# Patient Record
Sex: MALE | Race: WHITE | NOT HISPANIC OR LATINO | Employment: OTHER | URBAN - METROPOLITAN AREA
[De-identification: names, ages, dates, MRNs, and addresses within clinical notes are randomized per-mention and may not be internally consistent; named-entity substitution may affect disease eponyms.]

---

## 2024-10-07 ENCOUNTER — TELEPHONE (OUTPATIENT)
Dept: FAMILY MEDICINE | Facility: CLINIC | Age: 74
End: 2024-10-07
Payer: MEDICARE

## 2024-10-07 NOTE — TELEPHONE ENCOUNTER
----- Message from Dyan sent at 10/7/2024  8:31 AM CDT -----  Pt was in with his son and was told if his sciatica didn't get better to call us. He is a new patient with [facility]   [phone]

## 2024-10-22 ENCOUNTER — OFFICE VISIT (OUTPATIENT)
Dept: FAMILY MEDICINE | Facility: CLINIC | Age: 74
End: 2024-10-22
Payer: MEDICARE

## 2024-10-22 DIAGNOSIS — M54.40 ACUTE RIGHT-SIDED LOW BACK PAIN WITH SCIATICA, SCIATICA LATERALITY UNSPECIFIED: ICD-10-CM

## 2024-10-22 DIAGNOSIS — Z00.00 PHYSICAL EXAM: ICD-10-CM

## 2024-10-22 DIAGNOSIS — E78.5 HYPERLIPIDEMIA, UNSPECIFIED HYPERLIPIDEMIA TYPE: ICD-10-CM

## 2024-10-22 DIAGNOSIS — F17.200 NICOTINE DEPENDENCE, UNCOMPLICATED, UNSPECIFIED NICOTINE PRODUCT TYPE: ICD-10-CM

## 2024-10-22 DIAGNOSIS — Z79.899 HIGH RISK MEDICATION USE: Primary | ICD-10-CM

## 2024-10-22 PROCEDURE — 3288F FALL RISK ASSESSMENT DOCD: CPT | Mod: CPTII,S$GLB,, | Performed by: NURSE PRACTITIONER

## 2024-10-22 PROCEDURE — 1125F AMNT PAIN NOTED PAIN PRSNT: CPT | Mod: CPTII,S$GLB,, | Performed by: NURSE PRACTITIONER

## 2024-10-22 PROCEDURE — 1160F RVW MEDS BY RX/DR IN RCRD: CPT | Mod: CPTII,S$GLB,, | Performed by: NURSE PRACTITIONER

## 2024-10-22 PROCEDURE — 99406 BEHAV CHNG SMOKING 3-10 MIN: CPT | Mod: S$GLB,,, | Performed by: NURSE PRACTITIONER

## 2024-10-22 PROCEDURE — 1159F MED LIST DOCD IN RCRD: CPT | Mod: CPTII,S$GLB,, | Performed by: NURSE PRACTITIONER

## 2024-10-22 PROCEDURE — 3078F DIAST BP <80 MM HG: CPT | Mod: CPTII,S$GLB,, | Performed by: NURSE PRACTITIONER

## 2024-10-22 PROCEDURE — 1101F PT FALLS ASSESS-DOCD LE1/YR: CPT | Mod: CPTII,S$GLB,, | Performed by: NURSE PRACTITIONER

## 2024-10-22 PROCEDURE — 3075F SYST BP GE 130 - 139MM HG: CPT | Mod: CPTII,S$GLB,, | Performed by: NURSE PRACTITIONER

## 2024-10-22 PROCEDURE — 99204 OFFICE O/P NEW MOD 45 MIN: CPT | Mod: 25,S$GLB,, | Performed by: NURSE PRACTITIONER

## 2024-10-22 RX ORDER — IBUPROFEN 200 MG
200 TABLET ORAL
COMMUNITY

## 2024-10-22 RX ORDER — TIZANIDINE 4 MG/1
4 TABLET ORAL EVERY 8 HOURS
Qty: 30 TABLET | Refills: 0 | Status: SHIPPED | OUTPATIENT
Start: 2024-10-22 | End: 2024-11-01

## 2024-10-22 RX ORDER — METHYLPREDNISOLONE 4 MG/1
TABLET ORAL
Qty: 21 EACH | Refills: 0 | Status: SHIPPED | OUTPATIENT
Start: 2024-10-22 | End: 2024-11-12

## 2024-10-22 RX ORDER — ASPIRIN 81 MG/1
81 TABLET ORAL
COMMUNITY

## 2024-10-22 NOTE — PROGRESS NOTES
SUBJECTIVE:    Patient ID: Randy Prieto is a 74 y.o. male.    Chief Complaint: Establish Care (No bottles//Pt is here to establish and for sciatic nerve pain-states the ibuprofen was to much and gave a little bit of relief//KE)    History of Present Illness    CHIEF COMPLAINT:  Randy presents today for follow-up of back pain.    BACK PAIN:  He reports back pain that started three months ago, initially lasting a few days. The pain recurred after helping his grandfather change a brake light switch and flared up again following home improvement activities. He describes radiating pain down one leg, localized to the lower back area. He denies experiencing any bowel or bladder incontinence.    MEDICAL HISTORY:  He reports a history of high cholesterol. He mentions a previous bladder cancer scare approximately 14 years ago, which resulted in negative findings. He denies any history of high blood pressure.    FAMILY HISTORY:  His father  of prostate cancer. His mother  from complications of childbirth, specifically a hemorrhage, possibly due to uterine rupture.    SOCIAL HISTORY:  He is a current smoker, reporting a one pack per day habit since age 12. He previously smoked two packs per day while working. He reports alcohol use socially, if ever, and denies any street drug use. He is a former , which involved significant physical activity including walking seven miles a day and climbing up and down steps on engines.    SLEEP:  He reports getting 8-9 hours of sleep per night, interrupted due to frequent bathroom visits. Despite the broken sleep pattern, he indicates feeling rested upon waking.    MEDICATIONS:  He reports previously being on cholesterol medication for approximately four months, which he discontinued due to dissatisfaction with his doctor rather than medication-related issues. He is currently not taking any medications.      ROS:  General: -fever, -chills, -fatigue, -weight gain,  -weight loss  Eyes: -vision changes, -redness, -discharge  ENT: -ear pain, -nasal congestion, -sore throat  Cardiovascular: -chest pain, -palpitations, -lower extremity edema  Respiratory: -cough, -shortness of breath  Gastrointestinal: -abdominal pain, -nausea, -vomiting, -diarrhea, -constipation, -blood in stool  Genitourinary: -dysuria, -hematuria, -frequency, -urinary incontinence  Musculoskeletal: -joint pain, -muscle pain, +back pain  Skin: -rash, -lesion  Neurological: -headache, -dizziness, -numbness, -tingling  Psychiatric: -anxiety, -depression, +sleep difficulty         No visits with results within 6 Month(s) from this visit.   Latest known visit with results is:   Admission on 12/05/2022, Discharged on 12/06/2022   Component Date Value Ref Range Status    WBC 12/05/2022 13.88 (H)  3.90 - 12.70 K/uL Final    RBC 12/05/2022 5.50  4.60 - 6.20 M/uL Final    Hemoglobin 12/05/2022 17.0  14.0 - 18.0 g/dL Final    Hematocrit 12/05/2022 50.5  40.0 - 54.0 % Final    MCV 12/05/2022 92  82 - 98 fL Final    MCH 12/05/2022 30.9  27.0 - 31.0 pg Final    MCHC 12/05/2022 33.7  32.0 - 36.0 g/dL Final    RDW 12/05/2022 14.4  11.5 - 14.5 % Final    Platelets 12/05/2022 245  150 - 450 K/uL Final    MPV 12/05/2022 10.0  9.2 - 12.9 fL Final    Immature Granulocytes 12/05/2022 0.3  0.0 - 0.5 % Final    Gran # (ANC) 12/05/2022 9.4 (H)  1.8 - 7.7 K/uL Final    Immature Grans (Abs) 12/05/2022 0.04  0.00 - 0.04 K/uL Final    Lymph # 12/05/2022 3.2  1.0 - 4.8 K/uL Final    Mono # 12/05/2022 0.9  0.3 - 1.0 K/uL Final    Eos # 12/05/2022 0.3  0.0 - 0.5 K/uL Final    Baso # 12/05/2022 0.07  0.00 - 0.20 K/uL Final    nRBC 12/05/2022 0  0 /100 WBC Final    Gran % 12/05/2022 68.0  38.0 - 73.0 % Final    Lymph % 12/05/2022 22.7  18.0 - 48.0 % Final    Mono % 12/05/2022 6.7  4.0 - 15.0 % Final    Eosinophil % 12/05/2022 1.8  0.0 - 8.0 % Final    Basophil % 12/05/2022 0.5  0.0 - 1.9 % Final    Differential Method 12/05/2022 Automated    Final    Sodium 12/05/2022 137  136 - 145 mmol/L Final    Potassium 12/05/2022 3.9  3.5 - 5.1 mmol/L Final    Chloride 12/05/2022 104  95 - 110 mmol/L Final    CO2 12/05/2022 27  22 - 31 mmol/L Final    Glucose 12/05/2022 108  70 - 110 mg/dL Final    BUN 12/05/2022 14  9 - 21 mg/dL Final    Creatinine 12/05/2022 1.54 (H)  0.50 - 1.40 mg/dL Final    Calcium 12/05/2022 9.6  8.4 - 10.2 mg/dL Final    Total Protein 12/05/2022 8.3  6.0 - 8.4 g/dL Final    Albumin 12/05/2022 4.5  3.5 - 5.2 g/dL Final    Total Bilirubin 12/05/2022 0.4  0.2 - 1.3 mg/dL Final    Alkaline Phosphatase 12/05/2022 85  38 - 145 U/L Final    AST 12/05/2022 34  17 - 59 U/L Final    ALT 12/05/2022 36  0 - 50 U/L Final    Anion Gap 12/05/2022 6 (L)  8 - 16 mmol/L Final    eGFR 12/05/2022 47 (A)  >60 mL/min/1.73 m^2 Final    Magnesium 12/05/2022 2.1  1.6 - 2.6 mg/dL Final    NT-proBNP 12/05/2022 55  5 - 900 pg/mL Final    Troponin I 12/05/2022 <0.012  0.012 - 0.034 ng/mL Final    Troponin I 12/05/2022 <0.012  0.012 - 0.034 ng/mL Final    SARS-CoV-2 RNA, Amplification, Qual 12/05/2022 Negative  Negative Final    TSH 12/05/2022 4.290 (H)  0.400 - 4.000 uIU/mL Final       Past Medical History:   Diagnosis Date    Hyperlipidemia      Social History     Socioeconomic History    Marital status:    Tobacco Use    Smoking status: Every Day     Current packs/day: 1.50     Types: Cigarettes    Smokeless tobacco: Never   Substance and Sexual Activity    Alcohol use: Yes     Comment: very seldom-maybe half a glass of wine every two months or so   Social History Narrative    Sleeps about 8-9 hours per night    Exercise stays busy during the day    Retired from [employer]    Eats healthy     History reviewed. No pertinent surgical history.  Family History   Problem Relation Name Age of Onset    Prostate cancer Father         All of your core healthy metrics are met.      Review of patient's allergies indicates:  No Known Allergies    Current Outpatient  "Medications:     aspirin (ECOTRIN) 81 MG EC tablet, Take 81 mg by mouth as needed for Pain., Disp: , Rfl:     ibuprofen (ADVIL,MOTRIN) 200 MG tablet, Take 200 mg by mouth as needed for Pain., Disp: , Rfl:     methylPREDNISolone (MEDROL DOSEPACK) 4 mg tablet, use as directed, Disp: 21 each, Rfl: 0    tiZANidine (ZANAFLEX) 4 MG tablet, Take 1 tablet (4 mg total) by mouth every 8 (eight) hours. for 10 days, Disp: 30 tablet, Rfl: 0    Objective:      Vitals:    10/22/24 1151 10/22/24 1202   BP: (!) 158/74 136/70   Pulse: 81    SpO2: 98%    Weight: 75.2 kg (165 lb 12.8 oz)    Height: 5' 8" (1.727 m)      Physical Exam    General: No acute distress. Well-developed. Well-nourished.  Eyes: EOMI. Sclerae anicteric.  HENT: Normocephalic. Atraumatic. Nares patent. Moist oral mucosa.  Ears: Bilateral TMs clear. Bilateral EACs clear.  Cardiovascular: Regular rate. Regular rhythm. No murmurs. No rubs. No gallops. Normal S1, S2.  Respiratory: Normal respiratory effort. Clear to auscultation bilaterally. No rales. No rhonchi. No wheezing. Normal breath sounds.  Abdomen: Soft. Non-tender. Non-distended. Normoactive bowel sounds.  Musculoskeletal: No  obvious deformity. Palpation tenderness to lumbar spine. Negative bilateral straight leg. Decrease range of motion  Extremities: No lower extremity edema.  Neurological: Alert & oriented x3. No slurred speech. Normal gait.  Psychiatric: Normal mood. Normal affect. Good insight. Good judgment.  Skin: Warm. Dry. No rash.       Assessment:       Assessment & Plan    - Assessed chronic low back pain with sciatica, likely exacerbated by recent physical activities  - Considered conservative management with steroids and muscle relaxers before pursuing imaging or more invasive interventions  - Advised against NSAIDs while on steroid therapy    SCIATICA:  - Discussed the limitations of Tylenol for managing sciatic pain compared to ibuprofen.    BACK PAIN:  - Emphasized the importance of rest and " avoiding strenuous activities during treatment to allow for proper healing.  - Randy to rest and avoid strenuous activities for at least 2 weeks.  - Randy to avoid activities such as pulling out dishwashers or other heavy lifting.  - Randy to continue normal daily activities but refrain from tasks that may exacerbate back pain.  - Started steroid therapy for 6 days.  - Started muscle relaxer.  - Discontinued ibuprofen and other NSAIDs while on steroid therapy.  - Recommend Tylenol for pain management while on steroids.    FOLLOW UP:  - Contact the office to report on effectiveness of steroid and muscle relaxer treatment.       Plan:       High risk medication use    Physical exam    Hyperlipidemia, unspecified hyperlipidemia type  Comments:  labs. patient wants to wait until after dougie    Acute right-sided low back pain with sciatica, sciatica laterality unspecified  Comments:  medrol. zanaflex    Nicotine dependence, uncomplicated, unspecified nicotine product type  Comments:  discussed    Other orders  -     methylPREDNISolone (MEDROL DOSEPACK) 4 mg tablet; use as directed  Dispense: 21 each; Refill: 0  -     tiZANidine (ZANAFLEX) 4 MG tablet; Take 1 tablet (4 mg total) by mouth every 8 (eight) hours. for 10 days  Dispense: 30 tablet; Refill: 0      Follow up if symptoms worsen or fail to improve, for medication management.      ..Assistance with smoking cessation was offered, including:not interested  []  Medications  []  Counseling  []  Printed Information on Smoking Cessation  []  Referral to a Smoking Cessation Program    Patient was counseled regarding smoking for 3-10 minutes.   This note was generated with the assistance of ambient listening technology. Verbal consent was obtained by the patient and accompanying visitor(s) for the recording of patient appointment to facilitate this note. I attest to having reviewed and edited the generated note for accuracy, though some syntax or spelling errors may persist.  Please contact the author of this note for any clarification.      10/25/2024 Carol Bradford

## 2024-10-25 VITALS
OXYGEN SATURATION: 98 % | HEART RATE: 81 BPM | SYSTOLIC BLOOD PRESSURE: 136 MMHG | HEIGHT: 68 IN | WEIGHT: 165.81 LBS | DIASTOLIC BLOOD PRESSURE: 70 MMHG | BODY MASS INDEX: 25.13 KG/M2